# Patient Record
Sex: FEMALE | Race: BLACK OR AFRICAN AMERICAN | NOT HISPANIC OR LATINO | URBAN - NONMETROPOLITAN AREA
[De-identification: names, ages, dates, MRNs, and addresses within clinical notes are randomized per-mention and may not be internally consistent; named-entity substitution may affect disease eponyms.]

---

## 2016-05-10 PROBLEM — H40.1131: Noted: 2017-02-14

## 2016-05-10 PROBLEM — H11.31: Noted: 2020-03-10

## 2019-01-11 ENCOUNTER — IMPORTED ENCOUNTER (OUTPATIENT)
Dept: URBAN - NONMETROPOLITAN AREA CLINIC 1 | Facility: CLINIC | Age: 84
End: 2019-01-11

## 2019-01-11 PROCEDURE — 92012 INTRM OPH EXAM EST PATIENT: CPT

## 2019-01-11 PROCEDURE — 92133 CPTRZD OPH DX IMG PST SGM ON: CPT

## 2019-01-11 NOTE — PATIENT DISCUSSION
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 -  discussed the chronic progressive nature of this disease and various treatment options. -  importance of good compliance with medications was emphasized. -continue current treatment of Latanaprost qhs and Timolol qammonitorOCT TODAY/STABLE*DRUSEN MACULA:.-  Discussed findings of exam in detail with the patient. -  discussed the chronic nature of this disease and limited treatment options. -  recommended no smoking.monitorPseudophakia OUmonitor

## 2019-06-17 ENCOUNTER — IMPORTED ENCOUNTER (OUTPATIENT)
Dept: URBAN - NONMETROPOLITAN AREA CLINIC 1 | Facility: CLINIC | Age: 84
End: 2019-06-17

## 2019-06-17 PROCEDURE — 92012 INTRM OPH EXAM EST PATIENT: CPT

## 2019-06-17 PROCEDURE — 92083 EXTENDED VISUAL FIELD XM: CPT

## 2019-06-17 NOTE — PATIENT DISCUSSION
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 -  discussed the chronic progressive nature of this disease and various treatment options. -  importance of good compliance with medications was emphasized. -continue current treatment of Latanaprost qhs and Timolol qammonitorhasn't had gtt in 2 monthsrestart gttsvf TODAY/STABLE*DRUSEN MACULA:.-  Discussed findings of exam in detail with the patient. -  discussed the chronic nature of this disease and limited treatment options. -  recommended no smoking.monitorPseudophakia OUmonitor

## 2019-10-23 ENCOUNTER — IMPORTED ENCOUNTER (OUTPATIENT)
Dept: URBAN - NONMETROPOLITAN AREA CLINIC 1 | Facility: CLINIC | Age: 84
End: 2019-10-23

## 2019-10-23 PROCEDURE — 92250 FUNDUS PHOTOGRAPHY W/I&R: CPT

## 2019-10-23 PROCEDURE — 92014 COMPRE OPH EXAM EST PT 1/>: CPT

## 2019-10-23 PROCEDURE — 92020 GONIOSCOPY: CPT

## 2019-10-23 NOTE — PATIENT DISCUSSION
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 -  discussed the chronic progressive nature of this disease and various treatment options. -  importance of good compliance with medications was emphasized. -continue current treatment of Latanaprost qhs and Timolol qammonitorCONT XAL QHS OSCONT TIMOL QAM OSPHOTOS TAKEN*DRUSEN MACULA:.-  Discussed findings of exam in detail with the patient. -  discussed the chronic nature of this disease and limited treatment options. -  recommended no smoking.monitorPseudophakia OUmonitor

## 2020-03-10 ENCOUNTER — IMPORTED ENCOUNTER (OUTPATIENT)
Dept: URBAN - NONMETROPOLITAN AREA CLINIC 1 | Facility: CLINIC | Age: 85
End: 2020-03-10

## 2020-03-10 PROCEDURE — 92012 INTRM OPH EXAM EST PATIENT: CPT

## 2020-03-10 PROCEDURE — 92133 CPTRZD OPH DX IMG PST SGM ON: CPT

## 2020-03-10 NOTE — PATIENT DISCUSSION
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 -  discussed the chronic progressive nature of this disease and various treatment options. -  importance of good compliance with medications was emphasized. -continue current treatment of Latanaprost qhs and Timolol qammonitorCONT XAL QHS OSCONT TIMOL QAM OSoct today/stable ou*DRUSEN MACULA:.-  Discussed findings of exam in detail with the patient. -  discussed the chronic nature of this disease and limited treatment options. -  recommended no smoking.monitorPseudophakia OUmonitorsubconj heme odmonitor

## 2020-07-14 ENCOUNTER — IMPORTED ENCOUNTER (OUTPATIENT)
Dept: URBAN - NONMETROPOLITAN AREA CLINIC 1 | Facility: CLINIC | Age: 85
End: 2020-07-14

## 2020-07-14 PROCEDURE — 92083 EXTENDED VISUAL FIELD XM: CPT

## 2020-07-14 PROCEDURE — 92012 INTRM OPH EXAM EST PATIENT: CPT

## 2020-07-14 NOTE — PATIENT DISCUSSION
*PRIMARY OPEN ANGLE GLAUCOMA:. increased iop today-CONT XAL QHS OS-CONT TIMOL QAM OS-vf today/stable ou-stress gtt compliance*DRUSEN MACULA:.-  Discussed findings of exam in detail with the patient. -  discussed the chronic nature of this disease and limited treatment options. -  recommended no smoking.monitorPseudophakia OUmonitor for pco

## 2020-09-16 NOTE — PATIENT DISCUSSION
New Prescription: Zymaxid (gatifloxacin): drops: 0.5% 1 drop four times a day as directed into both eyes 09-

## 2020-09-16 NOTE — PATIENT DISCUSSION
CYLINDER/AXIS CONFIRMED &amp; CHALLENGED MULTIPLE TIMES - PT.  PREFERS AXIS OF MANIFEST ( OD 75 OS sphere )

## 2020-09-16 NOTE — PATIENT DISCUSSION
New Prescription: prednisolone acetate (prednisolone acetate): drops,suspension: 1% 1 drop four times a day into both eyes 09-

## 2020-10-22 NOTE — PATIENT DISCUSSION
New Prescription: Zymaxid (gatifloxacin): drops: 0.5% 1 drop four times a day as directed into both eyes 10-

## 2020-11-13 ENCOUNTER — IMPORTED ENCOUNTER (OUTPATIENT)
Dept: URBAN - NONMETROPOLITAN AREA CLINIC 1 | Facility: CLINIC | Age: 85
End: 2020-11-13

## 2020-11-13 PROCEDURE — 92020 GONIOSCOPY: CPT

## 2020-11-13 PROCEDURE — 92014 COMPRE OPH EXAM EST PT 1/>: CPT

## 2020-11-13 NOTE — PATIENT DISCUSSION
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 -  discussed the chronic nature of this disease and limited treatment options. -  recommended no smoking.monitorPseudophakia OUmonitor for pco

## 2021-03-12 ENCOUNTER — IMPORTED ENCOUNTER (OUTPATIENT)
Dept: URBAN - NONMETROPOLITAN AREA CLINIC 1 | Facility: CLINIC | Age: 86
End: 2021-03-12

## 2021-03-12 PROCEDURE — 92012 INTRM OPH EXAM EST PATIENT: CPT

## 2021-03-12 NOTE — PATIENT DISCUSSION
*PRIMARY OPEN ANGLE GLAUCOMA:-STABLE IOP TODAY-CONT XAL QHS OS-CONT TIMOL QAM OS-stress gtt compliance-MONITOR FOR IOP AND VF*DRUSEN MACULA:.-  Discussed findings of exam in detail with the patient. -  discussed the chronic nature of this disease and limited treatment options. -  recommended no smoking.monitorPseudophakia OUmonitor for pco

## 2021-11-04 ENCOUNTER — IMPORTED ENCOUNTER (OUTPATIENT)
Dept: URBAN - NONMETROPOLITAN AREA CLINIC 1 | Facility: CLINIC | Age: 86
End: 2021-11-04

## 2021-11-04 ENCOUNTER — PREPPED CHART (OUTPATIENT)
Dept: RURAL CLINIC 1 | Facility: CLINIC | Age: 86
End: 2021-11-04

## 2021-11-04 PROCEDURE — 92014 COMPRE OPH EXAM EST PT 1/>: CPT

## 2022-03-02 ASSESSMENT — TONOMETRY
OD_IOP_MMHG: 19
OS_IOP_MMHG: 18

## 2022-04-15 ASSESSMENT — VISUAL ACUITY
OD_SC: 20/25
OS_SC: 20/30
OD_SC: 20/30
OS_SC: 20/30
OS_CC: J1
OD_CC: J1
OD_SC: 20/30
OD_SC: 20/30
OU_SC: 20/25
OD_SC: 20/30
OS_SC: 20/25
OS_SC: 20/25
OD_SC: 20/30
OS_SC: 20/25
OD_SC: 20/30
OS_SC: 20/25
OS_CC: J1
OD_CC: J1
OD_SC: 20/30
OS_SC: 20/30
OS_SC: 20/25
OU_SC: 20/25

## 2022-04-15 ASSESSMENT — TONOMETRY
OD_IOP_MMHG: 20
OD_IOP_MMHG: 1919
OS_IOP_MMHG: 18
OD_IOP_MMHG: 19
OD_IOP_MMHG: 16
OD_IOP_MMHG: 20
OS_IOP_MMHG: 22
OS_IOP_MMHG: 24
OS_IOP_MMHG: 20
OD_IOP_MMHG: 17
OS_IOP_MMHG: 15
OD_IOP_MMHG: 20
OS_IOP_MMHG: 26
OD_IOP_MMHG: 19
OS_IOP_MMHG: 31

## 2022-05-03 ENCOUNTER — FOLLOW UP (OUTPATIENT)
Dept: RURAL CLINIC 1 | Facility: CLINIC | Age: 87
End: 2022-05-03

## 2022-05-03 DIAGNOSIS — H35.363: ICD-10-CM

## 2022-05-03 DIAGNOSIS — Z96.1: ICD-10-CM

## 2022-05-03 DIAGNOSIS — H40.1131: ICD-10-CM

## 2022-05-03 PROCEDURE — 99213 OFFICE O/P EST LOW 20 MIN: CPT

## 2022-05-03 PROCEDURE — 92133 CPTRZD OPH DX IMG PST SGM ON: CPT

## 2022-05-03 ASSESSMENT — VISUAL ACUITY
OD_SC: 20/50
OS_SC: 20/30

## 2022-05-03 ASSESSMENT — TONOMETRY
OS_IOP_MMHG: 14
OD_IOP_MMHG: 32

## 2022-11-02 ENCOUNTER — EMERGENCY VISIT (OUTPATIENT)
Dept: RURAL CLINIC 1 | Facility: CLINIC | Age: 87
End: 2022-11-02

## 2022-11-02 DIAGNOSIS — H40.1131: ICD-10-CM

## 2022-11-02 DIAGNOSIS — Z96.1: ICD-10-CM

## 2022-11-02 DIAGNOSIS — H43.811: ICD-10-CM

## 2022-11-02 DIAGNOSIS — H35.363: ICD-10-CM

## 2022-11-02 PROCEDURE — 92014 COMPRE OPH EXAM EST PT 1/>: CPT

## 2022-11-02 ASSESSMENT — TONOMETRY
OS_IOP_MMHG: 16
OD_IOP_MMHG: 16

## 2022-11-02 ASSESSMENT — VISUAL ACUITY
OU_CC: 20/25
OD_CC: 20/30
OS_CC: 20/25

## 2023-10-12 ENCOUNTER — EMERGENCY VISIT (OUTPATIENT)
Dept: RURAL CLINIC 1 | Facility: CLINIC | Age: 88
End: 2023-10-12

## 2023-10-12 DIAGNOSIS — Z96.1: ICD-10-CM

## 2023-10-12 DIAGNOSIS — H40.1131: ICD-10-CM

## 2023-10-12 DIAGNOSIS — H35.363: ICD-10-CM

## 2023-10-12 DIAGNOSIS — H43.811: ICD-10-CM

## 2023-10-12 PROCEDURE — 92014 COMPRE OPH EXAM EST PT 1/>: CPT

## 2023-10-12 ASSESSMENT — VISUAL ACUITY
OU_CC: 20/20
OD_CC: 20/50
OS_CC: 20/30

## 2023-10-12 ASSESSMENT — TONOMETRY
OS_IOP_MMHG: 30
OD_IOP_MMHG: 18

## 2023-11-08 ENCOUNTER — FOLLOW UP (OUTPATIENT)
Dept: RURAL CLINIC 1 | Facility: CLINIC | Age: 88
End: 2023-11-08

## 2023-11-08 DIAGNOSIS — Z96.1: ICD-10-CM

## 2023-11-08 DIAGNOSIS — H40.1133: ICD-10-CM

## 2023-11-08 DIAGNOSIS — H35.363: ICD-10-CM

## 2023-11-08 DIAGNOSIS — H43.811: ICD-10-CM

## 2023-11-08 PROCEDURE — 92083 EXTENDED VISUAL FIELD XM: CPT

## 2023-11-08 PROCEDURE — 99214 OFFICE O/P EST MOD 30 MIN: CPT

## 2023-11-08 ASSESSMENT — TONOMETRY
OS_IOP_MMHG: 20
OD_IOP_MMHG: 17

## 2023-11-08 ASSESSMENT — VISUAL ACUITY
OS_CC: 20/30
OD_CC: 20/30-2

## 2024-03-07 ENCOUNTER — FOLLOW UP (OUTPATIENT)
Dept: RURAL CLINIC 1 | Facility: CLINIC | Age: 89
End: 2024-03-07

## 2024-03-07 DIAGNOSIS — Z96.1: ICD-10-CM

## 2024-03-07 DIAGNOSIS — H40.1133: ICD-10-CM

## 2024-03-07 DIAGNOSIS — H35.363: ICD-10-CM

## 2024-03-07 DIAGNOSIS — H43.811: ICD-10-CM

## 2024-03-07 PROCEDURE — 92014 COMPRE OPH EXAM EST PT 1/>: CPT

## 2024-03-07 ASSESSMENT — VISUAL ACUITY
OU_CC: 20/25+2
OS_CC: 20/30
OD_CC: 20/30
OD_CC: 20/25+2
OS_CC: 20/25+2
OU_CC: 20/25-1

## 2024-03-07 ASSESSMENT — TONOMETRY
OS_IOP_MMHG: 18
OD_IOP_MMHG: 17

## 2024-09-09 ENCOUNTER — FOLLOW UP (OUTPATIENT)
Dept: RURAL CLINIC 1 | Facility: CLINIC | Age: 89
End: 2024-09-09

## 2024-09-09 DIAGNOSIS — H43.811: ICD-10-CM

## 2024-09-09 DIAGNOSIS — Z96.1: ICD-10-CM

## 2024-09-09 DIAGNOSIS — H35.363: ICD-10-CM

## 2024-09-09 DIAGNOSIS — H40.1133: ICD-10-CM

## 2024-09-09 PROCEDURE — 92014 COMPRE OPH EXAM EST PT 1/>: CPT

## 2024-09-09 PROCEDURE — 92020 GONIOSCOPY: CPT
